# Patient Record
Sex: MALE | URBAN - METROPOLITAN AREA
[De-identification: names, ages, dates, MRNs, and addresses within clinical notes are randomized per-mention and may not be internally consistent; named-entity substitution may affect disease eponyms.]

---

## 2020-09-01 ENCOUNTER — OFFICE VISIT (OUTPATIENT)
Dept: URBAN - METROPOLITAN AREA CLINIC 113 | Facility: CLINIC | Age: 25
End: 2020-09-01
Payer: COMMERCIAL

## 2020-09-01 ENCOUNTER — WEB ENCOUNTER (OUTPATIENT)
Dept: URBAN - METROPOLITAN AREA CLINIC 113 | Facility: CLINIC | Age: 25
End: 2020-09-01

## 2020-09-01 VITALS
DIASTOLIC BLOOD PRESSURE: 75 MMHG | SYSTOLIC BLOOD PRESSURE: 120 MMHG | BODY MASS INDEX: 22.05 KG/M2 | RESPIRATION RATE: 18 BRPM | HEIGHT: 70 IN | TEMPERATURE: 98.3 F | HEART RATE: 52 BPM | WEIGHT: 154 LBS

## 2020-09-01 DIAGNOSIS — F41.9 ANXIETY: ICD-10-CM

## 2020-09-01 DIAGNOSIS — K58.0 IRRITABLE BOWEL SYNDROME WITH DIARRHEA: ICD-10-CM

## 2020-09-01 PROCEDURE — G8421 BMI NOT CALCULATED: HCPCS | Performed by: INTERNAL MEDICINE

## 2020-09-01 PROCEDURE — 1036F TOBACCO NON-USER: CPT | Performed by: INTERNAL MEDICINE

## 2020-09-01 PROCEDURE — G8427 DOCREV CUR MEDS BY ELIG CLIN: HCPCS | Performed by: INTERNAL MEDICINE

## 2020-09-01 PROCEDURE — 99203 OFFICE O/P NEW LOW 30 MIN: CPT | Performed by: INTERNAL MEDICINE

## 2020-09-01 RX ORDER — HYDROXYZINE PAMOATE 25 MG/1
1 CAPSULE AS NEEDED CAPSULE ORAL
Status: ACTIVE | COMMUNITY

## 2020-09-01 RX ORDER — DICYCLOMINE HYDROCHLORIDE 20 MG/1
1 TABLET TABLET ORAL THREE TIMES A DAY
Status: ACTIVE | COMMUNITY
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
## 2020-11-09 ENCOUNTER — OFFICE VISIT (OUTPATIENT)
Dept: URBAN - METROPOLITAN AREA CLINIC 113 | Facility: CLINIC | Age: 25
End: 2020-11-09

## 2020-11-18 ENCOUNTER — OFFICE VISIT (OUTPATIENT)
Dept: URBAN - METROPOLITAN AREA CLINIC 113 | Facility: CLINIC | Age: 25
End: 2020-11-18
Payer: COMMERCIAL

## 2020-11-18 ENCOUNTER — WEB ENCOUNTER (OUTPATIENT)
Dept: URBAN - METROPOLITAN AREA CLINIC 113 | Facility: CLINIC | Age: 25
End: 2020-11-18

## 2020-11-18 VITALS
TEMPERATURE: 97.8 F | HEIGHT: 70 IN | WEIGHT: 156 LBS | SYSTOLIC BLOOD PRESSURE: 136 MMHG | RESPIRATION RATE: 18 BRPM | HEART RATE: 79 BPM | DIASTOLIC BLOOD PRESSURE: 80 MMHG | BODY MASS INDEX: 22.33 KG/M2

## 2020-11-18 DIAGNOSIS — F41.9 ANXIETY: ICD-10-CM

## 2020-11-18 DIAGNOSIS — R10.32 LEFT LOWER QUADRANT ABDOMINAL PAIN: ICD-10-CM

## 2020-11-18 DIAGNOSIS — R07.89 ATYPICAL CHEST PAIN: ICD-10-CM

## 2020-11-18 DIAGNOSIS — K58.0 IRRITABLE BOWEL SYNDROME WITH DIARRHEA: ICD-10-CM

## 2020-11-18 PROCEDURE — 99213 OFFICE O/P EST LOW 20 MIN: CPT | Performed by: NURSE PRACTITIONER

## 2020-11-18 RX ORDER — HYDROXYZINE PAMOATE 25 MG/1
1 CAPSULE AS NEEDED CAPSULE ORAL
Status: ON HOLD | COMMUNITY

## 2020-11-18 RX ORDER — HYOSCYAMINE SULFATE 0.12 MG/1
1 TABLET UNDER THE TONGUE AND ALLOW TO DISSOLVE  AS NEEDED TABLET ORAL; SUBLINGUAL
Qty: 60 | Refills: 1 | OUTPATIENT
Start: 2020-11-18 | End: 2021-01-17

## 2020-11-18 RX ORDER — ESOMEPRAZOLE MAGNESIUM 20 MG/1
1 CAPSULE CAPSULE, DELAYED RELEASE ORAL ONCE A DAY
Status: ACTIVE | COMMUNITY

## 2020-11-18 RX ORDER — DICYCLOMINE HYDROCHLORIDE 20 MG/1
1 TABLET TABLET ORAL THREE TIMES A DAY
Status: ON HOLD | COMMUNITY
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
## 2021-01-20 ENCOUNTER — OFFICE VISIT (OUTPATIENT)
Dept: URBAN - METROPOLITAN AREA CLINIC 113 | Facility: CLINIC | Age: 26
End: 2021-01-20

## 2021-02-11 ENCOUNTER — TELEPHONE ENCOUNTER (OUTPATIENT)
Dept: URBAN - METROPOLITAN AREA CLINIC 113 | Facility: CLINIC | Age: 26
End: 2021-02-11

## 2021-02-11 ENCOUNTER — OFFICE VISIT (OUTPATIENT)
Dept: URBAN - METROPOLITAN AREA CLINIC 107 | Facility: CLINIC | Age: 26
End: 2021-02-11

## 2021-02-11 RX ORDER — HYDROXYZINE PAMOATE 25 MG/1
1 CAPSULE AS NEEDED CAPSULE ORAL
Status: ON HOLD | COMMUNITY

## 2021-02-11 RX ORDER — ESOMEPRAZOLE MAGNESIUM 20 MG/1
1 CAPSULE CAPSULE, DELAYED RELEASE ORAL ONCE A DAY
Status: ACTIVE | COMMUNITY

## 2021-02-11 RX ORDER — DICYCLOMINE HYDROCHLORIDE 20 MG/1
1 TABLET TABLET ORAL THREE TIMES A DAY
Status: ON HOLD | COMMUNITY

## 2021-02-11 NOTE — HPI-TODAY'S VISIT:
This is a 24-year-old male with a history of anxiety and diarrhea predominant irritable bowel syndrome presenting for follow-up.  He was last seen 11/18/2020.  He reported recent onset of substernal or left sternal chest pain that started 2-1/2 weeks prior to his visit unassociated with meals, activity, or position relieved somewhat by counterpressure.  He reported some decrease in frequency after starting Nexium 20 mg daily.  Functional dyspepsia was within the differential.  He was prescribed Nexium 40 mg daily for 2 weeks to ascertain symptom response.  He had a previous CT scan.  The record was requested.  He had persistent lower abdominal pain.  It was discussed this is likely functional associated with IBS.  Pain was exacerbated by anxiety suggesting a component of visceral hypersensitivity.  Prior imaging reports were requested.  His bowel habits were regular with episodic diarrhea.  He was instructed to begin daily fiber and to use MiraLAX as needed.  He was prescribed hyoscyamine.  Cymbalta or low-dose amitriptyline were future considerations.

## 2021-02-16 ENCOUNTER — WEB ENCOUNTER (OUTPATIENT)
Dept: URBAN - METROPOLITAN AREA CLINIC 107 | Facility: CLINIC | Age: 26
End: 2021-02-16

## 2021-02-16 ENCOUNTER — OFFICE VISIT (OUTPATIENT)
Dept: URBAN - METROPOLITAN AREA CLINIC 107 | Facility: CLINIC | Age: 26
End: 2021-02-16
Payer: COMMERCIAL

## 2021-02-16 VITALS
HEART RATE: 75 BPM | TEMPERATURE: 98.9 F | HEIGHT: 70 IN | BODY MASS INDEX: 23.48 KG/M2 | WEIGHT: 164 LBS | DIASTOLIC BLOOD PRESSURE: 78 MMHG | SYSTOLIC BLOOD PRESSURE: 129 MMHG

## 2021-02-16 DIAGNOSIS — R07.89 ATYPICAL CHEST PAIN: ICD-10-CM

## 2021-02-16 DIAGNOSIS — F41.9 ANXIETY: ICD-10-CM

## 2021-02-16 DIAGNOSIS — K58.0 IRRITABLE BOWEL SYNDROME WITH DIARRHEA: ICD-10-CM

## 2021-02-16 DIAGNOSIS — R10.32 LEFT LOWER QUADRANT ABDOMINAL PAIN: ICD-10-CM

## 2021-02-16 PROBLEM — 301716002: Status: ACTIVE | Noted: 2020-11-18

## 2021-02-16 PROBLEM — 48694002: Status: ACTIVE | Noted: 2020-09-01

## 2021-02-16 PROBLEM — 197125005: Status: ACTIVE | Noted: 2020-09-01

## 2021-02-16 PROCEDURE — 99213 OFFICE O/P EST LOW 20 MIN: CPT | Performed by: INTERNAL MEDICINE

## 2021-02-16 RX ORDER — DICYCLOMINE HYDROCHLORIDE 20 MG/1
1 TABLET TABLET ORAL THREE TIMES A DAY
Status: ACTIVE | COMMUNITY

## 2021-02-16 RX ORDER — BUSPIRONE HYDROCHLORIDE 5 MG/1
1 TABLET TABLET ORAL TWICE A DAY
Qty: 180 | Refills: 2 | OUTPATIENT
Start: 2021-02-16

## 2021-02-16 RX ORDER — ESOMEPRAZOLE MAGNESIUM 20 MG/1
1 CAPSULE CAPSULE, DELAYED RELEASE ORAL ONCE A DAY
Status: ACTIVE | COMMUNITY

## 2021-02-16 RX ORDER — HYDROXYZINE PAMOATE 25 MG/1
1 CAPSULE AS NEEDED CAPSULE ORAL
Status: ON HOLD | COMMUNITY

## 2021-02-16 NOTE — HPI-TODAY'S VISIT:
25 y/o male presenting for f/u. He was on nexium and that did not help much. He also    11/2020 Mr. Mak is a 24-year-old male with a history of anxiety and diarrhea predominant irritable bowel syndrome presenting for follow-up.  He was last seen 11/18/2020.  He reported recent onset of substernal or left sternal chest pain that started 2-1/2 weeks prior to his visit unassociated with meals, activity, or position relieved somewhat by counterpressure.  He reported some decrease in frequency after starting Nexium 20 mg daily.  Functional dyspepsia was within the differential.  He was prescribed Nexium 40 mg daily for 2 weeks to ascertain symptom response.  He had a previous CT scan.  The record was requested.  He had persistent lower abdominal pain.  It was discussed this is likely functional associated with IBS.  Pain was exacerbated by anxiety suggesting a component of visceral hypersensitivity.  Prior imaging reports were requested.  His bowel habits were regular with episodic diarrhea.  He was instructed to begin daily fiber and to use MiraLAX as needed.  He was prescribed hyoscyamine.  Cymbalta or low-dose amitriptyline were future considerations.

## 2021-05-19 ENCOUNTER — OFFICE VISIT (OUTPATIENT)
Dept: URBAN - METROPOLITAN AREA CLINIC 107 | Facility: CLINIC | Age: 26
End: 2021-05-19

## 2021-08-05 ENCOUNTER — OFFICE VISIT (OUTPATIENT)
Dept: URBAN - METROPOLITAN AREA CLINIC 107 | Facility: CLINIC | Age: 26
End: 2021-08-05

## 2021-09-24 ENCOUNTER — WEB ENCOUNTER (OUTPATIENT)
Dept: URBAN - METROPOLITAN AREA CLINIC 107 | Facility: CLINIC | Age: 26
End: 2021-09-24

## 2021-09-24 ENCOUNTER — OFFICE VISIT (OUTPATIENT)
Dept: URBAN - METROPOLITAN AREA CLINIC 107 | Facility: CLINIC | Age: 26
End: 2021-09-24
Payer: COMMERCIAL

## 2021-09-24 VITALS
SYSTOLIC BLOOD PRESSURE: 116 MMHG | WEIGHT: 164 LBS | BODY MASS INDEX: 23.48 KG/M2 | DIASTOLIC BLOOD PRESSURE: 75 MMHG | HEIGHT: 70 IN | TEMPERATURE: 98.4 F | HEART RATE: 62 BPM

## 2021-09-24 DIAGNOSIS — K21.9 GASTROESOPHAGEAL REFLUX DISEASE, UNSPECIFIED WHETHER ESOPHAGITIS PRESENT: ICD-10-CM

## 2021-09-24 DIAGNOSIS — R07.89 ATYPICAL CHEST PAIN: ICD-10-CM

## 2021-09-24 DIAGNOSIS — K59.09 OTHER CONSTIPATION: ICD-10-CM

## 2021-09-24 PROCEDURE — 99214 OFFICE O/P EST MOD 30 MIN: CPT | Performed by: INTERNAL MEDICINE

## 2021-09-24 RX ORDER — HYDROXYZINE PAMOATE 25 MG/1
1 CAPSULE AS NEEDED CAPSULE ORAL
Status: ON HOLD | COMMUNITY

## 2021-09-24 RX ORDER — BUSPIRONE HYDROCHLORIDE 5 MG/1
1 TABLET TABLET ORAL TWICE A DAY
Qty: 180 | Refills: 2
Start: 2021-02-16

## 2021-09-24 RX ORDER — BUSPIRONE HYDROCHLORIDE 5 MG/1
1 TABLET TABLET ORAL TWICE A DAY
Qty: 180 | Refills: 2 | Status: ON HOLD | COMMUNITY
Start: 2021-02-16

## 2021-09-24 RX ORDER — ESOMEPRAZOLE MAGNESIUM 20 MG/1
1 CAPSULE CAPSULE, DELAYED RELEASE ORAL ONCE A DAY
Status: ACTIVE | COMMUNITY

## 2021-09-24 RX ORDER — DICYCLOMINE HYDROCHLORIDE 20 MG/1
1 TABLET TABLET ORAL THREE TIMES A DAY
Status: ON HOLD | COMMUNITY

## 2021-09-24 NOTE — HPI-TODAY'S VISIT:
This is a 25-year-old male with a history of anxiety, diarrhea predominant irritable bowel syndrome, atypical chest pain, and left lower quadrant pain presenting for follow-up. He was last seen 2/16/2021.  He had been taking Nexium and reports it was minimally helpful.  He reported some decrease in frequency taking Nexium 20 mg daily.  It was discussed his symptoms may be acid peptic in origin or associated with functional dyspepsia.  He was prescribed BuSpar to  treat functional GI related pain.  A surgical or urological evaluation was recommended for testicular/groin pain. He does not recall taking BuSpar.  He continues to report pain in his chest indicated today left and right of the mid sternum.  In the past, he felt this was unassociated with meals.  Lately, he has been eating more in an attempt to "bulk up."  He has had episodes of acid reflux and chest pain has been more frequent.  He is now convinced that chest pain may be associated with a digestive process.  He has been occasionally awaken from sleep in the last week because of pain.  He has restarted Nexium which he is taking it 2 or 3 times a week if he anticipates symptoms and is avoiding late meals.  Episodes of chest pain occur at random once or twice a week.  He has occasional pain in the left upper quadrant.  He reports hard stools.  He has occasional brief lower abdominal pain in variable locations associated with constipation and reports occasional bloating.  He has to strain once or twice a week.  He denies other abdominal symptoms.  He discussed his symptoms with Dr. Ch and has an appointment next week for a cardiac evaluation with Dr. Rizo.  He continues to report anxiety.

## 2021-09-25 PROBLEM — 235595009: Status: ACTIVE | Noted: 2021-09-25

## 2021-09-25 PROBLEM — 14760008: Status: ACTIVE | Noted: 2021-09-25

## 2021-09-25 PROBLEM — 102589003: Status: ACTIVE | Noted: 2020-11-18

## 2021-11-16 ENCOUNTER — OFFICE VISIT (OUTPATIENT)
Dept: URBAN - METROPOLITAN AREA CLINIC 107 | Facility: CLINIC | Age: 26
End: 2021-11-16

## 2021-12-27 ENCOUNTER — OFFICE VISIT (OUTPATIENT)
Dept: URBAN - METROPOLITAN AREA CLINIC 107 | Facility: CLINIC | Age: 26
End: 2021-12-27

## 2021-12-27 RX ORDER — BUSPIRONE HYDROCHLORIDE 5 MG/1
1 TABLET TABLET ORAL TWICE A DAY
Qty: 180 | Refills: 2 | Status: ACTIVE | COMMUNITY
Start: 2021-02-16

## 2021-12-27 RX ORDER — DICYCLOMINE HYDROCHLORIDE 20 MG/1
1 TABLET TABLET ORAL THREE TIMES A DAY
Status: ON HOLD | COMMUNITY

## 2021-12-27 RX ORDER — ESOMEPRAZOLE MAGNESIUM 20 MG/1
1 CAPSULE CAPSULE, DELAYED RELEASE ORAL ONCE A DAY
Status: ACTIVE | COMMUNITY

## 2021-12-27 RX ORDER — HYDROXYZINE PAMOATE 25 MG/1
1 CAPSULE AS NEEDED CAPSULE ORAL
Status: ON HOLD | COMMUNITY

## 2021-12-27 NOTE — HPI-TODAY'S VISIT:
This is a 26-year-old male with a history of anxiety, diarrhea predominant irritable bowel syndrome, atypical chest pain, constipation, and GERD presenting for follow-up. He was last seen 9/24/2021.  At a prior visit, he had been prescribed BuSpar but did not recall taking it.  He had been eating more food and then attempt to gain weight and was having episodes of acid reflux and chest pain that were more frequent.  He was convinced that the chest pain was related to a digestive process and had occasionally awaken from sleep because of pain.  He had restarted Nexium and was taking it 2 or 3 times a week in anticipation of symptoms.  He was avoiding late meals.  He admitted occasional pain in the left upper quadrant.  He reported hard stools.  It was discussed there was likely a component of functional dyspepsia.  The options of increasing Nexium to 40 mg daily or restarting BuSpar were discussed.  He opted to restart BuSpar and to continue Nexium as needed.  EGD was a future consideration if his symptoms were persistent.  He had a cardiac evaluation planned with Dr. Rizo and was to proceed as planned.  He was instructed to start Benefiber daily for constipation and to use MiraLAX daily if needed.

## 2022-01-18 ENCOUNTER — OFFICE VISIT (OUTPATIENT)
Dept: URBAN - METROPOLITAN AREA CLINIC 107 | Facility: CLINIC | Age: 27
End: 2022-01-18

## 2022-04-08 ENCOUNTER — OFFICE VISIT (OUTPATIENT)
Dept: URBAN - METROPOLITAN AREA CLINIC 107 | Facility: CLINIC | Age: 27
End: 2022-04-08

## 2024-01-26 ENCOUNTER — DASHBOARD ENCOUNTERS (OUTPATIENT)
Age: 29
End: 2024-01-26

## 2024-01-26 ENCOUNTER — OFFICE VISIT (OUTPATIENT)
Dept: URBAN - METROPOLITAN AREA CLINIC 107 | Facility: CLINIC | Age: 29
End: 2024-01-26
Payer: COMMERCIAL

## 2024-01-26 VITALS
HEART RATE: 73 BPM | RESPIRATION RATE: 18 BRPM | SYSTOLIC BLOOD PRESSURE: 101 MMHG | TEMPERATURE: 98.3 F | BODY MASS INDEX: 23.05 KG/M2 | WEIGHT: 161 LBS | DIASTOLIC BLOOD PRESSURE: 64 MMHG | HEIGHT: 70 IN

## 2024-01-26 DIAGNOSIS — R10.13 EPIGASTRIC PAIN: ICD-10-CM

## 2024-01-26 DIAGNOSIS — R07.89 ATYPICAL CHEST PAIN: ICD-10-CM

## 2024-01-26 DIAGNOSIS — R10.12 LUQ PAIN: ICD-10-CM

## 2024-01-26 DIAGNOSIS — K59.09 OTHER CONSTIPATION: ICD-10-CM

## 2024-01-26 PROCEDURE — 99214 OFFICE O/P EST MOD 30 MIN: CPT

## 2024-01-26 RX ORDER — ESOMEPRAZOLE MAGNESIUM 20 MG/1
1 CAPSULE CAPSULE, DELAYED RELEASE ORAL ONCE A DAY
Status: ON HOLD | COMMUNITY

## 2024-01-26 RX ORDER — DICYCLOMINE HYDROCHLORIDE 20 MG/1
1 TABLET TABLET ORAL THREE TIMES A DAY
Status: ON HOLD | COMMUNITY

## 2024-01-26 RX ORDER — OMEPRAZOLE 40 MG/1
1 CAPSULE 30 MINUTES BEFORE MORNING MEAL CAPSULE, DELAYED RELEASE ORAL ONCE A DAY
Qty: 90 | Refills: 1 | OUTPATIENT
Start: 2024-01-26

## 2024-01-26 RX ORDER — OMEPRAZOLE 20 MG/1
1 CAPSULE 30 MINUTES BEFORE MORNING MEAL CAPSULE, DELAYED RELEASE ORAL PRN
Status: ACTIVE | COMMUNITY

## 2024-01-26 RX ORDER — BUSPIRONE HYDROCHLORIDE 5 MG/1
1 TABLET TABLET ORAL TWICE A DAY
Qty: 180 | Refills: 2 | Status: ON HOLD | COMMUNITY
Start: 2021-02-16

## 2024-01-26 RX ORDER — HYDROXYZINE PAMOATE 25 MG/1
1 CAPSULE AS NEEDED CAPSULE ORAL
Status: ON HOLD | COMMUNITY

## 2024-01-26 NOTE — PHYSICAL EXAM GASTROINTESTINAL
soft, mild tenderness to palpation of LUQ along costal margin, no tenderness on palpation of rib, nondistended , normal bowel sounds

## 2024-01-26 NOTE — HPI-TODAY'S VISIT:
This is a 28-year-old male with a history of anxiety, diarrhea predominant irritable bowel syndrome, atypical chest pain, constipation, and GERD presenting for long interval follow up and evaluation of chest pain.  He was last seen 9/24/2021.  At a prior visit, he had been prescribed BuSpar but did not recall taking it.  He had been eating more food and then attempt to gain weight and was having episodes of acid reflux and chest pain that were more frequent.  He was convinced that the chest pain was related to a digestive process and had occasionally awakened from sleep because of pain.  He had restarted Nexium and was taking it 2 or 3 times a week in anticipation of symptoms.  He was avoiding late meals.  He admitted occasional pain in the left upper quadrant.  He reported hard stools.  It was discussed there was likely a component of functional dyspepsia.  The options of increasing Nexium to 40 mg daily or restarting BuSpar were discussed.  He opted to restart BuSpar and to continue Nexium as needed.  EGD was a future consideration if his symptoms were persistent.  He had a cardiac evaluation planned with Dr. Rizo and was to proceed as planned.  He was instructed to start Benefiber daily for constipation and to use MiraLAX daily if needed.  He presents with complaints of infrequent, intermittent LUQ/epigastric/chest pain. THis occurs at random and not associated with food or bowel movements. Denies nausea, vomiting, reflux, dysphagia or odynophagia. Bowel movements are regular. He does have intermittent LLQ bulging. This was assessed in 2020 with unrevealing CT imaging. Denies blood per rectum or melena.   He underwent stress testing with Dr. Rizo a few years ago. THis was negative. He denies ETOH, marijuana or other recreational drug use.

## 2024-08-15 ENCOUNTER — OFFICE VISIT (OUTPATIENT)
Dept: URBAN - METROPOLITAN AREA CLINIC 107 | Facility: CLINIC | Age: 29
End: 2024-08-15

## 2024-08-20 ENCOUNTER — OFFICE VISIT (OUTPATIENT)
Dept: URBAN - METROPOLITAN AREA CLINIC 113 | Facility: CLINIC | Age: 29
End: 2024-08-20
Payer: SELF-PAY

## 2024-08-20 ENCOUNTER — LAB OUTSIDE AN ENCOUNTER (OUTPATIENT)
Dept: URBAN - METROPOLITAN AREA CLINIC 113 | Facility: CLINIC | Age: 29
End: 2024-08-20

## 2024-08-20 VITALS
DIASTOLIC BLOOD PRESSURE: 75 MMHG | SYSTOLIC BLOOD PRESSURE: 120 MMHG | TEMPERATURE: 98.2 F | WEIGHT: 161 LBS | BODY MASS INDEX: 23.05 KG/M2 | HEIGHT: 70 IN | HEART RATE: 74 BPM

## 2024-08-20 DIAGNOSIS — K21.9 GERD: ICD-10-CM

## 2024-08-20 DIAGNOSIS — R10.32 LEFT LOWER QUADRANT ABDOMINAL PAIN: ICD-10-CM

## 2024-08-20 DIAGNOSIS — R07.89 ATYPICAL CHEST PAIN: ICD-10-CM

## 2024-08-20 PROCEDURE — 99214 OFFICE O/P EST MOD 30 MIN: CPT | Performed by: NURSE PRACTITIONER

## 2024-08-20 RX ORDER — ESOMEPRAZOLE MAGNESIUM 20 MG/1
1 CAPSULE CAPSULE, DELAYED RELEASE ORAL ONCE A DAY
Status: ON HOLD | COMMUNITY

## 2024-08-20 RX ORDER — DICYCLOMINE HYDROCHLORIDE 20 MG/1
1 TABLET TABLET ORAL THREE TIMES A DAY
Status: ON HOLD | COMMUNITY

## 2024-08-20 RX ORDER — IBUPROFEN 200 MG/1
1 TABLET WITH FOOD OR MILK AS NEEDED TABLET, FILM COATED ORAL THREE TIMES A DAY
Status: ACTIVE | COMMUNITY

## 2024-08-20 RX ORDER — BUSPIRONE HYDROCHLORIDE 5 MG/1
1 TABLET TABLET ORAL TWICE A DAY
Qty: 180 | Refills: 2 | Status: ON HOLD | COMMUNITY
Start: 2021-02-16

## 2024-08-20 RX ORDER — OMEPRAZOLE 40 MG/1
1 CAPSULE 30 MINUTES BEFORE MORNING MEAL CAPSULE, DELAYED RELEASE ORAL ONCE A DAY
Qty: 90 | Refills: 1 | Status: ON HOLD | COMMUNITY
Start: 2024-01-26

## 2024-08-20 RX ORDER — HYDROXYZINE PAMOATE 25 MG/1
1 CAPSULE AS NEEDED CAPSULE ORAL
Status: ON HOLD | COMMUNITY

## 2024-08-20 NOTE — HPI-TODAY'S VISIT:
28-year-old male with a history of anxiety, IBS-D, GERD, presenting for follow-up regarding abdominal and atypical chest pain. He was last seen in the office in January for follow-up regarding intermittent chest, epigastric and left upper quadrant abdominal pain.  Previous CT of the chest and abdominal CT, as well as cardiac evaluation, had been unremarkable.  An upper endoscopy versus repeat CT was discussed, but he declined further diagnostic evaluation in favor of conservative management.  His omeprazole was increased to 40 mg daily with consideration for upper endoscopy if symptoms were refractory.  BuSpar was also considered. He returns today with similar complaints, which have been worsening over the last several weeks.  He reports a recent exacerbation of significant heartburn after eating a fatty meal.  He complains of postprandial epigastric and left-sided chest pain, indigestion, excess belching and regurgitation, unchanged with a short course of omeprazole.  The pain often radiates to his back and has affected his sleep.  He denies dysphagia, but does state food will slowly move down the esophagus at times.  He takes ibuprofen a few times per week. Near the close of the visit, he also reports continued intermittent left lower quadrant abdominal pain which is not related to meals or bowel movements.  This can be sharp at times and does improve with stretching or application of pressure. He has not had any recent abdominal imaging.

## 2024-08-30 ENCOUNTER — OFFICE VISIT (OUTPATIENT)
Dept: URBAN - METROPOLITAN AREA CLINIC 107 | Facility: CLINIC | Age: 29
End: 2024-08-30

## 2024-09-16 ENCOUNTER — CLAIMS CREATED FROM THE CLAIM WINDOW (OUTPATIENT)
Dept: URBAN - METROPOLITAN AREA SURGERY CENTER 25 | Facility: SURGERY CENTER | Age: 29
End: 2024-09-16
Payer: COMMERCIAL

## 2024-09-16 ENCOUNTER — CLAIMS CREATED FROM THE CLAIM WINDOW (OUTPATIENT)
Dept: URBAN - METROPOLITAN AREA CLINIC 4 | Facility: CLINIC | Age: 29
End: 2024-09-16
Payer: COMMERCIAL

## 2024-09-16 DIAGNOSIS — K29.60 OTHER GASTRITIS WITHOUT BLEEDING: ICD-10-CM

## 2024-09-16 DIAGNOSIS — R10.13 EPIGASTRIC PAIN: ICD-10-CM

## 2024-09-16 DIAGNOSIS — K29.70 GASTRITIS, UNSPECIFIED, WITHOUT BLEEDING: ICD-10-CM

## 2024-09-16 DIAGNOSIS — R12 HEARTBURN: ICD-10-CM

## 2024-09-16 PROCEDURE — 00731 ANES UPR GI NDSC PX NOS: CPT | Performed by: ANESTHESIOLOGIST ASSISTANT

## 2024-09-16 PROCEDURE — 43239 EGD BIOPSY SINGLE/MULTIPLE: CPT | Performed by: INTERNAL MEDICINE

## 2024-09-16 PROCEDURE — 88312 SPECIAL STAINS GROUP 1: CPT | Performed by: PATHOLOGY

## 2024-09-16 PROCEDURE — 88305 TISSUE EXAM BY PATHOLOGIST: CPT | Performed by: PATHOLOGY

## 2024-09-16 PROCEDURE — 00731 ANES UPR GI NDSC PX NOS: CPT | Performed by: ANESTHESIOLOGY

## 2024-09-16 RX ORDER — DICYCLOMINE HYDROCHLORIDE 20 MG/1
1 TABLET TABLET ORAL THREE TIMES A DAY
Status: ON HOLD | COMMUNITY

## 2024-09-16 RX ORDER — BUSPIRONE HYDROCHLORIDE 5 MG/1
1 TABLET TABLET ORAL TWICE A DAY
Qty: 180 | Refills: 2 | Status: ON HOLD | COMMUNITY
Start: 2021-02-16

## 2024-09-16 RX ORDER — OMEPRAZOLE 40 MG/1
1 CAPSULE 30 MINUTES BEFORE MORNING MEAL CAPSULE, DELAYED RELEASE ORAL ONCE A DAY
Qty: 90 | Refills: 1 | Status: ON HOLD | COMMUNITY
Start: 2024-01-26

## 2024-09-16 RX ORDER — ESOMEPRAZOLE MAGNESIUM 20 MG/1
1 CAPSULE CAPSULE, DELAYED RELEASE ORAL ONCE A DAY
Status: ON HOLD | COMMUNITY

## 2024-09-16 RX ORDER — IBUPROFEN 200 MG/1
1 TABLET WITH FOOD OR MILK AS NEEDED TABLET, FILM COATED ORAL THREE TIMES A DAY
Status: ACTIVE | COMMUNITY

## 2024-09-16 RX ORDER — HYDROXYZINE PAMOATE 25 MG/1
1 CAPSULE AS NEEDED CAPSULE ORAL
Status: ON HOLD | COMMUNITY

## 2024-10-01 ENCOUNTER — OFFICE VISIT (OUTPATIENT)
Dept: URBAN - METROPOLITAN AREA CLINIC 113 | Facility: CLINIC | Age: 29
End: 2024-10-01

## 2024-10-10 ENCOUNTER — OFFICE VISIT (OUTPATIENT)
Dept: URBAN - METROPOLITAN AREA CLINIC 113 | Facility: CLINIC | Age: 29
End: 2024-10-10
Payer: COMMERCIAL

## 2024-10-10 VITALS
SYSTOLIC BLOOD PRESSURE: 110 MMHG | HEART RATE: 52 BPM | BODY MASS INDEX: 21.42 KG/M2 | TEMPERATURE: 98.6 F | HEIGHT: 70 IN | WEIGHT: 149.6 LBS | RESPIRATION RATE: 18 BRPM | DIASTOLIC BLOOD PRESSURE: 63 MMHG

## 2024-10-10 DIAGNOSIS — R07.89 ATYPICAL CHEST PAIN: ICD-10-CM

## 2024-10-10 DIAGNOSIS — K21.9 GERD: ICD-10-CM

## 2024-10-10 DIAGNOSIS — K29.60 OTHER GASTRITIS WITHOUT BLEEDING: ICD-10-CM

## 2024-10-10 PROCEDURE — 99214 OFFICE O/P EST MOD 30 MIN: CPT | Performed by: NURSE PRACTITIONER

## 2024-10-10 RX ORDER — ESOMEPRAZOLE MAGNESIUM 20 MG/1
1 CAPSULE CAPSULE, DELAYED RELEASE ORAL ONCE A DAY
Status: ON HOLD | COMMUNITY

## 2024-10-10 RX ORDER — OMEPRAZOLE 40 MG/1
1 CAPSULE 30 MINUTES BEFORE MORNING MEAL CAPSULE, DELAYED RELEASE ORAL ONCE A DAY
Qty: 90 | Refills: 1 | Status: ON HOLD | COMMUNITY
Start: 2024-01-26

## 2024-10-10 RX ORDER — HYDROXYZINE PAMOATE 25 MG/1
1 CAPSULE AS NEEDED CAPSULE ORAL
Status: ON HOLD | COMMUNITY

## 2024-10-10 RX ORDER — BUSPIRONE HYDROCHLORIDE 5 MG/1
1 TABLET TABLET ORAL TWICE A DAY
Qty: 180 | Refills: 2 | Status: ON HOLD | COMMUNITY
Start: 2021-02-16

## 2024-10-10 RX ORDER — PANTOPRAZOLE 40 MG/1
1 TABLET TABLET, DELAYED RELEASE ORAL ONCE A DAY
Qty: 90 TABLET | Refills: 3 | OUTPATIENT
Start: 2024-10-10

## 2024-10-10 RX ORDER — DICYCLOMINE HYDROCHLORIDE 20 MG/1
1 TABLET TABLET ORAL THREE TIMES A DAY
Status: ON HOLD | COMMUNITY

## 2024-10-10 RX ORDER — IBUPROFEN 200 MG/1
1 TABLET WITH FOOD OR MILK AS NEEDED TABLET, FILM COATED ORAL THREE TIMES A DAY
Status: ACTIVE | COMMUNITY

## 2024-10-10 NOTE — HPI-TODAY'S VISIT:
28-year-old male with a history of anxiety, IBS-D, GERD, presenting for follow-up regarding abdominal and atypical chest pain. He was seen in the office in August for evaluation of a recent exacerbation of chronic postprandial epigastric and atypical chest pain, heartburn and indigestion. The differential included reflux esophagitis, peptic ulcer disease, or nonulcer dyspepsia. He reported no benefit from a short course of PPI. An upper endoscopy was planned. Regarding left lower quadrant abdominal pain, a CT of the abdomen and pelvis was recommended to exclude acute abdominal process. Previous cardiac evaluation was negative. It was discussed that anxiety was likely playing a role in his ongoing symptoms. EGD 9/16/2024:Normal esophagus, nonerosive gastritis characterized by erythema, normal examined duodenum.  Gastric biopsies were negative for H. pylori. CT of the abdomen and pelvis with contrast 9/10/2024 showed mild distal colon diverticulosis, otherwise no acute process. His symptoms have marginally improved. He continues to experience symptoms of upper abdominal pain, heartburn, and belching, though less severe than prior. He has occasional left lower quadrant abdominal discomfort, which is fleeting- subsided within a few seconds. His bowel habits are regular. He denies blood per rectum.